# Patient Record
Sex: FEMALE | Race: BLACK OR AFRICAN AMERICAN | URBAN - METROPOLITAN AREA
[De-identification: names, ages, dates, MRNs, and addresses within clinical notes are randomized per-mention and may not be internally consistent; named-entity substitution may affect disease eponyms.]

---

## 2024-09-11 ENCOUNTER — EVALUATION (OUTPATIENT)
Dept: PHYSICAL THERAPY | Facility: REHABILITATION | Age: 39
End: 2024-09-11
Payer: COMMERCIAL

## 2024-09-11 DIAGNOSIS — S82.65XD CLOSED NONDISPLACED FRACTURE OF LATERAL MALLEOLUS OF LEFT FIBULA WITH ROUTINE HEALING, SUBSEQUENT ENCOUNTER: Primary | ICD-10-CM

## 2024-09-11 PROCEDURE — 97110 THERAPEUTIC EXERCISES: CPT | Performed by: PHYSICAL THERAPIST

## 2024-09-11 PROCEDURE — 97161 PT EVAL LOW COMPLEX 20 MIN: CPT | Performed by: PHYSICAL THERAPIST

## 2024-09-11 NOTE — LETTER
2024    Ochoa Morgan MD  [address] [facility] [place] [address]    Patient: Tri Avila   YOB: 1985   Date of Visit: 2024     Encounter Diagnosis     ICD-10-CM    1. Closed nondisplaced fracture of lateral malleolus of left fibula with routine healing, subsequent encounter  S82.65XD           Dear Dr. Morgan:    Thank you for your recent referral of Tri Avila. Please review the attached evaluation summary from Tri's recent visit.     Please verify that you agree with the plan of care by signing the attached order.     If you have any questions or concerns, please do not hesitate to call.     I sincerely appreciate the opportunity to share in the care of one of your patients and hope to have another opportunity to work with you in the near future.       Sincerely,    Anni Villanueva, PT      Referring Provider:      I certify that I have read the below Plan of Care and certify the need for these services furnished under this plan of treatment while under my care.                    Ochoa Morgan MD  [address] [facility] [place] [address]  Via Fax: [phone]          PT Evaluation     Today's date: 2024  Patient name: Tri Avila  : 1985  MRN: [id number]  Referring provider: Ochoa Morgan MD  Dx:   Encounter Diagnosis     ICD-10-CM    1. Closed nondisplaced fracture of lateral malleolus of left fibula with routine healing, subsequent encounter  S82.65XD           Start Time: 1120  Stop Time: 1204  Total time in clinic (min): 44 minutes    Assessment  Impairments: abnormal gait, abnormal or restricted ROM, activity intolerance, impaired balance, impaired physical strength, lacks appropriate home exercise program and pain with function    Assessment details: Tri Avila is a 39 y.o. year old female who presents to  with Closed nondisplaced fracture of lateral malleolus of left fibula with routine healing, subsequent  encounter. Mild limitations in left ankle range of motion and strength noted compared to contralateral. Greatest limitation at this time with functional strength, weightbearing tolerance and single leg stance limiting ambulation, stair negotiation and functional movements such as squatting. Tri presents with the impairments as listed above and would benefit from Physical Therapy to address these impairments, improved quality of life and to maximize function.       Goals  ST-4 weeks   Patient will report <6/10 left ankle pain.  Patient will demonstrate improvement in left ankle strength by at least 1-2 grades.    LT-6 weeks  Patient will be able to negotiate stairs with normal reciprocal pattern without limitation from left ankle pain.  Patient will ambulate with LRAD and normal reciprocal gait pattern without limitation from left ankle pain for at least 1 hour.  Patient will be able to squat to do laundry and household chores without limitation.  Patient will be independent with HEP.      Plan  Patient would benefit from: skilled physical therapy  Planned modality interventions: cryotherapy    Frequency: 2x week  Duration in weeks: 6  Plan of Care beginning date: 2024  Plan of Care expiration date: 10/23/2024  Treatment plan discussed with: patient        Subjective Evaluation    History of Present Illness  Mechanism of injury: Patient is a 39 y.o. presenting to physical therapy with closed non displaced left ankle fracture. She was roller skating with her kids on  and fell landing with the left leg/foot underneath her. Patient was in a soft cast for a week and hard cast for about a month. Transitioned out of cast for about 1.5 weeks. She reports an increase in swelling but improves with elevation and ice.    She has some pain with standing and walking for about 40 minutes prior to needing a break. She can do housework for about 30 minutes prior to needing a break. She reports difficulty with  squatting such as to do laundry.        Patient Goals  Patient goals for therapy: decreased edema, decreased pain, improved balance, increased motion, increased strength, independence with ADLs/IADLs and return to sport/leisure activities  Patient goal: get strength back to stand, walk and return to PLOF  Pain  Current pain ratin  At best pain ratin  At worst pain ratin  Location: front of ankle  Quality: discomfort  Relieving factors: ice and support (elevating)  Aggravating factors: standing and walking (prolonged standing and walking, squatting)    Social Support  Stairs in house: yes   Lives in: multiple-level home  Lives with: adult children and young children    Employment status: not working        Objective     Tenderness   Left Ankle/Foot   Tenderness in the medial malleolus. No tenderness in the anterior ankle, lateral malleolus and posterior tibial tendon.     Active Range of Motion   Left Ankle/Foot   Dorsiflexion (ke): 0 degrees   Plantar flexion: 60 degrees   Inversion: 25 degrees   Eversion: 30 degrees     Right Ankle/Foot   Dorsiflexion (ke): 2 degrees   Plantar flexion: 60 degrees   Inversion: 38 degrees   Eversion: 48 degrees     Passive Range of Motion   Left Ankle/Foot    Dorsiflexion (ke): 4 degrees   Inversion: 30 degrees   Eversion: 40 degrees     Right Ankle/Foot    Dorsiflexion (ke): 4 degrees   Inversion: 40 degrees   Eversion: 50 degrees     Strength/Myotome Testing     Left Ankle/Foot   Dorsiflexion: 4+  Plantar flexion: 4+  Inversion: 4  Eversion: 4    Right Ankle/Foot   Normal strength    General Comments:      Ankle/Foot Comments   Ambulating with reciprocal gait but decreased left stance time                  Precautions: n/a    Daily Treatment Diary               Manual           L ankle PROM inversion/eversion nv          L AP TC mobs nv                                Neuro Re-Ed           Towel curls            Short foot                      SLS nv          SLS  "foam           SLS ball toss           SLS clock                      Tandem walking                      Ther Ex           Elliptical           Gastroc st w strap or towel 3x30\" L          Standing gastroc st P!          Standing soleus st Eventually for squatting          TB ankle DF/PF GTB 2x10 (3x10 nv)          TB ankle inv/ev Pink 2x10 (3x10 nv)          Heel raises 2x10          Eccentric heel raises                      Ther Activity           Leg press - SL nv                     Step ups  nv          Step ups to foam           Step ups to high knee           Step overs nv                     Side stepping w TB           Monster walks           Walking lunges **          Diagonal hopping           Gait Training            TM walking           TM jogging progression                      Modalities           CP prn                      * = on hep                                " No

## 2024-09-11 NOTE — PROGRESS NOTES
PT Evaluation     Today's date: 2024  Patient name: Tri Avila  : 1985  MRN: [id number]  Referring provider: Ochoa Morgan MD  Dx:   Encounter Diagnosis     ICD-10-CM    1. Closed nondisplaced fracture of lateral malleolus of left fibula with routine healing, subsequent encounter  S82.65XD           Start Time: 1120  Stop Time: 1204  Total time in clinic (min): 44 minutes    Assessment  Impairments: abnormal gait, abnormal or restricted ROM, activity intolerance, impaired balance, impaired physical strength, lacks appropriate home exercise program and pain with function    Assessment details: Tri Avila is a 39 y.o. year old female who presents to [facility] with Closed nondisplaced fracture of lateral malleolus of left fibula with routine healing, subsequent encounter. Mild limitations in left ankle range of motion and strength noted compared to contralateral. Greatest limitation at this time with functional strength, weightbearing tolerance and single leg stance limiting ambulation, stair negotiation and functional movements such as squatting. Tri presents with the impairments as listed above and would benefit from Physical Therapy to address these impairments, improved quality of life and to maximize function.       Goals  ST-4 weeks   Patient will report <6/10 left ankle pain.  Patient will demonstrate improvement in left ankle strength by at least 1-2 grades.    LT-6 weeks  Patient will be able to negotiate stairs with normal reciprocal pattern without limitation from left ankle pain.  Patient will ambulate with LRAD and normal reciprocal gait pattern without limitation from left ankle pain for at least 1 hour.  Patient will be able to squat to do laundry and household chores without limitation.  Patient will be independent with HEP.      Plan  Patient would benefit from: skilled physical therapy  Planned modality interventions: cryotherapy    Frequency: 2x week  Duration in weeks:  6  Plan of Care beginning date: 2024  Plan of Care expiration date: 10/23/2024  Treatment plan discussed with: patient        Subjective Evaluation    History of Present Illness  Mechanism of injury: Patient is a 39 y.o. presenting to physical therapy with closed non displaced left ankle fracture. She was roller skating with her kids on  and fell landing with the left leg/foot underneath her. Patient was in a soft cast for a week and hard cast for about a month. Transitioned out of cast for about 1.5 weeks. She reports an increase in swelling but improves with elevation and ice.    She has some pain with standing and walking for about 40 minutes prior to needing a break. She can do housework for about 30 minutes prior to needing a break. She reports difficulty with squatting such as to do laundry.        Patient Goals  Patient goals for therapy: decreased edema, decreased pain, improved balance, increased motion, increased strength, independence with ADLs/IADLs and return to sport/leisure activities  Patient goal: get strength back to stand, walk and return to PLOF  Pain  Current pain ratin  At best pain ratin  At worst pain ratin  Location: front of ankle  Quality: discomfort  Relieving factors: ice and support (elevating)  Aggravating factors: standing and walking (prolonged standing and walking, squatting)    Social Support  Stairs in house: yes   Lives in: multiple-level home  Lives with: adult children and young children    Employment status: not working        Objective     Tenderness   Left Ankle/Foot   Tenderness in the medial malleolus. No tenderness in the anterior ankle, lateral malleolus and posterior tibial tendon.     Active Range of Motion   Left Ankle/Foot   Dorsiflexion (ke): 0 degrees   Plantar flexion: 60 degrees   Inversion: 25 degrees   Eversion: 30 degrees     Right Ankle/Foot   Dorsiflexion (ke): 2 degrees   Plantar flexion: 60 degrees   Inversion: 38 degrees   Eversion:  "48 degrees     Passive Range of Motion   Left Ankle/Foot    Dorsiflexion (ke): 4 degrees   Inversion: 30 degrees   Eversion: 40 degrees     Right Ankle/Foot    Dorsiflexion (ke): 4 degrees   Inversion: 40 degrees   Eversion: 50 degrees     Strength/Myotome Testing     Left Ankle/Foot   Dorsiflexion: 4+  Plantar flexion: 4+  Inversion: 4  Eversion: 4    Right Ankle/Foot   Normal strength    General Comments:      Ankle/Foot Comments   Ambulating with reciprocal gait but decreased left stance time                  Precautions: n/a    Daily Treatment Diary     9/11          Manual           L ankle PROM inversion/eversion nv          L AP TC mobs nv                                Neuro Re-Ed           Towel curls            Short foot                      SLS nv          SLS foam           SLS ball toss           SLS clock                      Tandem walking                      Ther Ex           Elliptical           Gastroc st w strap or towel 3x30\" L          Standing gastroc st P!          Standing soleus st Eventually for squatting          TB ankle DF/PF GTB 2x10 (3x10 nv)          TB ankle inv/ev Pink 2x10 (3x10 nv)          Heel raises 2x10          Eccentric heel raises                      Ther Activity           Leg press - SL nv                     Step ups  nv          Step ups to foam           Step ups to high knee           Step overs nv                     Side stepping w TB           Monster walks           Walking lunges **          Diagonal hopping           Gait Training            TM walking           TM jogging progression                      Modalities           CP prn                      * = on hep                "

## 2024-09-17 ENCOUNTER — APPOINTMENT (OUTPATIENT)
Dept: PHYSICAL THERAPY | Facility: REHABILITATION | Age: 39
End: 2024-09-17
Payer: COMMERCIAL

## 2024-09-18 ENCOUNTER — APPOINTMENT (OUTPATIENT)
Dept: PHYSICAL THERAPY | Facility: REHABILITATION | Age: 39
End: 2024-09-18
Payer: COMMERCIAL

## 2024-09-26 ENCOUNTER — APPOINTMENT (OUTPATIENT)
Dept: PHYSICAL THERAPY | Facility: REHABILITATION | Age: 39
End: 2024-09-26
Payer: COMMERCIAL